# Patient Record
Sex: FEMALE | URBAN - METROPOLITAN AREA
[De-identification: names, ages, dates, MRNs, and addresses within clinical notes are randomized per-mention and may not be internally consistent; named-entity substitution may affect disease eponyms.]

---

## 2024-07-21 ENCOUNTER — PATIENT MESSAGE (OUTPATIENT)
Dept: PRIMARY CARE | Facility: CLINIC | Age: 34
End: 2024-07-21

## 2024-07-21 ENCOUNTER — TELEMEDICINE (OUTPATIENT)
Dept: PRIMARY CARE | Facility: CLINIC | Age: 34
End: 2024-07-21
Payer: COMMERCIAL

## 2024-07-21 DIAGNOSIS — S00.522D: ICD-10-CM

## 2024-07-21 DIAGNOSIS — K05.10: ICD-10-CM

## 2024-07-21 DIAGNOSIS — K02.9 DENTAL CARIES: Primary | ICD-10-CM

## 2024-07-21 DIAGNOSIS — K02.9 DENTAL CARIES: ICD-10-CM

## 2024-07-21 PROCEDURE — 99213 OFFICE O/P EST LOW 20 MIN: CPT

## 2024-07-21 RX ORDER — AMOXICILLIN AND CLAVULANATE POTASSIUM 875; 125 MG/1; MG/1
1 TABLET, FILM COATED ORAL 2 TIMES DAILY
Qty: 20 TABLET | Refills: 0 | Status: SHIPPED | OUTPATIENT
Start: 2024-07-21 | End: 2024-07-31

## 2024-07-21 RX ORDER — METRONIDAZOLE 500 MG/1
500 TABLET ORAL 2 TIMES DAILY
Qty: 14 TABLET | Refills: 0 | Status: SHIPPED | OUTPATIENT
Start: 2024-07-21 | End: 2024-07-22 | Stop reason: SDUPTHER

## 2024-07-21 ASSESSMENT — ENCOUNTER SYMPTOMS
FEVER: 0
SINUS PRESSURE: 0

## 2024-07-21 NOTE — PROGRESS NOTES
[employer] Virtual Visit Patient Consent     This visit was completed via video conference. All issues as below were discussed and addressed but no physical exam was performed. If it was felt that the patient should be evaluated in clinic than they were directed there. The patient verbally consented to the visit.    An interactive audio and video telecommunication system which permits real time communications between the patient (at the originating site) and provider (at the distant site) was utilized to provide this telehealth service.   Verbal consent was requested and obtained from Cristina Ferreira (or parent if under 18) 07/21/24 for a telehealth visit.   I have verbally confirmed with Cristina Ferreira (or parent if under 18) that they are physically located in the [facility] during this virtual visit.    Subjective   Patient ID: Cristina Ferreira is a 34 y.o. female who presents for Dental Pain.  Dental Pain   This is a recurrent problem. The current episode started today (originally was 2 weeks ago, was seen in urgent care). The problem occurs constantly. The problem has been gradually worsening. The pain is at a severity of 5/10. The pain is moderate. Associated symptoms include facial pain, oral bleeding and thermal sensitivity. Pertinent negatives include no difficulty swallowing, fever or sinus pressure. Treatments tried: antibiotics, does not have dental appointment till august. The treatment provided moderate relief.       Review of Systems   Constitutional:  Negative for fever.   HENT:  Negative for sinus pressure.        Objective  venlafaxin and omeprazole for daily meds      There were no vitals taken for this visit.       Physical Exam pt seen from her home to be in no acute distress.     Assessment/Plan   Cristina was seen today for dental pain.  Diagnoses and all orders for this visit:  Dental caries  -     amoxicillin-pot clavulanate (Augmentin) 875-125 mg tablet; Take 1 tablet by mouth 2 times a day for  10 days.  -     metroNIDAZOLE (Flagyl) 500 mg tablet; Take 1 tablet (500 mg) by mouth 2 times a day for 7 days.  Blister of gingiva with infection, subsequent encounter  -     amoxicillin-pot clavulanate (Augmentin) 875-125 mg tablet; Take 1 tablet by mouth 2 times a day for 10 days.  -     metroNIDAZOLE (Flagyl) 500 mg tablet; Take 1 tablet (500 mg) by mouth 2 times a day for 7 days.

## 2024-07-22 RX ORDER — METRONIDAZOLE 500 MG/1
500 TABLET ORAL 2 TIMES DAILY
Qty: 14 TABLET | Refills: 0 | Status: SHIPPED | OUTPATIENT
Start: 2024-07-22 | End: 2024-07-29